# Patient Record
Sex: FEMALE | Race: BLACK OR AFRICAN AMERICAN | Employment: FULL TIME | URBAN - METROPOLITAN AREA
[De-identification: names, ages, dates, MRNs, and addresses within clinical notes are randomized per-mention and may not be internally consistent; named-entity substitution may affect disease eponyms.]

---

## 2018-10-29 LAB
HBSAG, EXTERNAL: NEGATIVE
HIV, EXTERNAL: NEGATIVE
RPR, EXTERNAL: NONREACTIVE
RUBELLA, EXTERNAL: NORMAL
TYPE, ABO & RH, EXTERNAL: NORMAL

## 2019-03-22 LAB — GRBS, EXTERNAL: NEGATIVE

## 2019-04-17 ENCOUNTER — ANESTHESIA (OUTPATIENT)
Dept: LABOR AND DELIVERY | Age: 29
DRG: 560 | End: 2019-04-17
Payer: MEDICAID

## 2019-04-17 ENCOUNTER — ANESTHESIA EVENT (OUTPATIENT)
Dept: LABOR AND DELIVERY | Age: 29
DRG: 560 | End: 2019-04-17
Payer: MEDICAID

## 2019-04-17 ENCOUNTER — HOSPITAL ENCOUNTER (INPATIENT)
Age: 29
LOS: 2 days | Discharge: HOME | DRG: 560 | End: 2019-04-19
Attending: OBSTETRICS & GYNECOLOGY | Admitting: OBSTETRICS & GYNECOLOGY
Payer: MEDICAID

## 2019-04-17 PROBLEM — Z3A.40 40 WEEKS GESTATION OF PREGNANCY: Status: ACTIVE | Noted: 2019-04-17

## 2019-04-17 PROBLEM — Z33.1 IUP (INTRAUTERINE PREGNANCY), INCIDENTAL: Status: ACTIVE | Noted: 2019-04-17

## 2019-04-17 LAB
ABO + RH BLD: NORMAL
APPEARANCE UR: CLEAR
BASOPHILS # BLD: 0 K/UL (ref 0–0.1)
BASOPHILS NFR BLD: 0 % (ref 0–2)
BILIRUB UR QL: NEGATIVE
BLOOD GROUP ANTIBODIES SERPL: NORMAL
COLOR UR: YELLOW
DIFFERENTIAL METHOD BLD: ABNORMAL
EOSINOPHIL # BLD: 0 K/UL (ref 0–0.4)
EOSINOPHIL NFR BLD: 0 % (ref 0–5)
ERYTHROCYTE [DISTWIDTH] IN BLOOD BY AUTOMATED COUNT: 15.4 % (ref 11.6–14.5)
GLUCOSE UR QL STRIP.AUTO: NEGATIVE MG/DL
HCT VFR BLD AUTO: 37.6 % (ref 35–45)
HGB BLD-MCNC: 12.1 G/DL (ref 12–16)
KETONES UR-MCNC: NEGATIVE MG/DL
LEUKOCYTE ESTERASE UR QL STRIP: ABNORMAL
LYMPHOCYTES # BLD: 2.2 K/UL (ref 0.9–3.6)
LYMPHOCYTES NFR BLD: 20 % (ref 21–52)
MCH RBC QN AUTO: 23.4 PG (ref 24–34)
MCHC RBC AUTO-ENTMCNC: 32.2 G/DL (ref 31–37)
MCV RBC AUTO: 72.9 FL (ref 74–97)
MONOCYTES # BLD: 1.1 K/UL (ref 0.05–1.2)
MONOCYTES NFR BLD: 11 % (ref 3–10)
NEUTS SEG # BLD: 7.4 K/UL (ref 1.8–8)
NEUTS SEG NFR BLD: 69 % (ref 40–73)
NITRITE UR QL: NEGATIVE
PH UR: 7 [PH] (ref 5–9)
PLATELET # BLD AUTO: 215 K/UL (ref 135–420)
PROT UR QL: NEGATIVE MG/DL
RBC # BLD AUTO: 5.16 M/UL (ref 4.2–5.3)
RBC # UR STRIP: ABNORMAL /UL
SERVICE CMNT-IMP: ABNORMAL
SP GR UR: 1.01 (ref 1–1.02)
SPECIMEN EXP DATE BLD: NORMAL
UROBILINOGEN UR QL: 0.2 EU/DL (ref 0.2–1)
WBC # BLD AUTO: 10.7 K/UL (ref 4.6–13.2)

## 2019-04-17 PROCEDURE — 74011000250 HC RX REV CODE- 250

## 2019-04-17 PROCEDURE — 74011250637 HC RX REV CODE- 250/637: Performed by: MIDWIFE

## 2019-04-17 PROCEDURE — 75410000002 HC LABOR FEE PER 1 HR

## 2019-04-17 PROCEDURE — 81003 URINALYSIS AUTO W/O SCOPE: CPT

## 2019-04-17 PROCEDURE — 77030011943

## 2019-04-17 PROCEDURE — 10907ZC DRAINAGE OF AMNIOTIC FLUID, THERAPEUTIC FROM PRODUCTS OF CONCEPTION, VIA NATURAL OR ARTIFICIAL OPENING: ICD-10-PCS | Performed by: OBSTETRICS & GYNECOLOGY

## 2019-04-17 PROCEDURE — 75410000003 HC RECOV DEL/VAG/CSECN EA 0.5 HR

## 2019-04-17 PROCEDURE — 74011250636 HC RX REV CODE- 250/636

## 2019-04-17 PROCEDURE — 75410000000 HC DELIVERY VAGINAL/SINGLE

## 2019-04-17 PROCEDURE — 85025 COMPLETE CBC W/AUTO DIFF WBC: CPT

## 2019-04-17 PROCEDURE — 74011250636 HC RX REV CODE- 250/636: Performed by: OBSTETRICS & GYNECOLOGY

## 2019-04-17 PROCEDURE — 65270000029 HC RM PRIVATE

## 2019-04-17 PROCEDURE — 59025 FETAL NON-STRESS TEST: CPT

## 2019-04-17 PROCEDURE — 86900 BLOOD TYPING SEROLOGIC ABO: CPT

## 2019-04-17 PROCEDURE — 77030010848 HC CATH INTUTR PRSS KOLB -B

## 2019-04-17 PROCEDURE — 74011250636 HC RX REV CODE- 250/636: Performed by: SPECIALIST

## 2019-04-17 RX ORDER — AMOXICILLIN 250 MG
1 CAPSULE ORAL
Status: DISCONTINUED | OUTPATIENT
Start: 2019-04-17 | End: 2019-04-19 | Stop reason: HOSPADM

## 2019-04-17 RX ORDER — FENTANYL/ROPIVACAINE/NS/PF 2MCG/ML-.1
1-15 PLASTIC BAG, INJECTION (ML) EPIDURAL
Status: DISCONTINUED | OUTPATIENT
Start: 2019-04-17 | End: 2019-04-18 | Stop reason: HOSPADM

## 2019-04-17 RX ORDER — SODIUM CHLORIDE, SODIUM LACTATE, POTASSIUM CHLORIDE, CALCIUM CHLORIDE 600; 310; 30; 20 MG/100ML; MG/100ML; MG/100ML; MG/100ML
125 INJECTION, SOLUTION INTRAVENOUS CONTINUOUS
Status: DISCONTINUED | OUTPATIENT
Start: 2019-04-17 | End: 2019-04-18 | Stop reason: HOSPADM

## 2019-04-17 RX ORDER — NALBUPHINE HYDROCHLORIDE 10 MG/ML
2.5 INJECTION, SOLUTION INTRAMUSCULAR; INTRAVENOUS; SUBCUTANEOUS
Status: DISCONTINUED | OUTPATIENT
Start: 2019-04-17 | End: 2019-04-18 | Stop reason: HOSPADM

## 2019-04-17 RX ORDER — FENTANYL CITRATE 50 UG/ML
100 INJECTION, SOLUTION INTRAMUSCULAR; INTRAVENOUS ONCE
Status: COMPLETED | OUTPATIENT
Start: 2019-04-17 | End: 2019-04-17

## 2019-04-17 RX ORDER — FENTANYL CITRATE 50 UG/ML
INJECTION, SOLUTION INTRAMUSCULAR; INTRAVENOUS AS NEEDED
Status: DISCONTINUED | OUTPATIENT
Start: 2019-04-17 | End: 2019-04-17 | Stop reason: HOSPADM

## 2019-04-17 RX ORDER — ONDANSETRON 2 MG/ML
4 INJECTION INTRAMUSCULAR; INTRAVENOUS
Status: DISCONTINUED | OUTPATIENT
Start: 2019-04-17 | End: 2019-04-18 | Stop reason: HOSPADM

## 2019-04-17 RX ORDER — HYDROMORPHONE HYDROCHLORIDE 1 MG/ML
1 INJECTION, SOLUTION INTRAMUSCULAR; INTRAVENOUS; SUBCUTANEOUS
Status: DISCONTINUED | OUTPATIENT
Start: 2019-04-17 | End: 2019-04-18 | Stop reason: HOSPADM

## 2019-04-17 RX ORDER — IBUPROFEN 400 MG/1
800 TABLET ORAL
Status: DISCONTINUED | OUTPATIENT
Start: 2019-04-17 | End: 2019-04-19 | Stop reason: HOSPADM

## 2019-04-17 RX ORDER — LIDOCAINE HYDROCHLORIDE 10 MG/ML
INJECTION INFILTRATION; PERINEURAL AS NEEDED
Status: DISCONTINUED | OUTPATIENT
Start: 2019-04-17 | End: 2019-04-17 | Stop reason: HOSPADM

## 2019-04-17 RX ORDER — TERBUTALINE SULFATE 1 MG/ML
0.25 INJECTION SUBCUTANEOUS
Status: DISCONTINUED | OUTPATIENT
Start: 2019-04-17 | End: 2019-04-18 | Stop reason: HOSPADM

## 2019-04-17 RX ORDER — ACETAMINOPHEN 325 MG/1
650 TABLET ORAL
Status: DISCONTINUED | OUTPATIENT
Start: 2019-04-17 | End: 2019-04-19 | Stop reason: HOSPADM

## 2019-04-17 RX ORDER — LIDOCAINE HYDROCHLORIDE 10 MG/ML
INJECTION, SOLUTION EPIDURAL; INFILTRATION; INTRACAUDAL; PERINEURAL AS NEEDED
Status: DISCONTINUED | OUTPATIENT
Start: 2019-04-17 | End: 2019-04-17 | Stop reason: HOSPADM

## 2019-04-17 RX ORDER — ZOLPIDEM TARTRATE 5 MG/1
5 TABLET ORAL
Status: DISCONTINUED | OUTPATIENT
Start: 2019-04-17 | End: 2019-04-19 | Stop reason: HOSPADM

## 2019-04-17 RX ORDER — MINERAL OIL
30 OIL (ML) ORAL AS NEEDED
Status: DISCONTINUED | OUTPATIENT
Start: 2019-04-17 | End: 2019-04-18 | Stop reason: HOSPADM

## 2019-04-17 RX ORDER — BUTORPHANOL TARTRATE 2 MG/ML
2 INJECTION INTRAMUSCULAR; INTRAVENOUS
Status: DISCONTINUED | OUTPATIENT
Start: 2019-04-17 | End: 2019-04-18 | Stop reason: HOSPADM

## 2019-04-17 RX ORDER — NALBUPHINE HYDROCHLORIDE 10 MG/ML
10 INJECTION, SOLUTION INTRAMUSCULAR; INTRAVENOUS; SUBCUTANEOUS
Status: DISCONTINUED | OUTPATIENT
Start: 2019-04-17 | End: 2019-04-18 | Stop reason: HOSPADM

## 2019-04-17 RX ORDER — NALOXONE HYDROCHLORIDE 0.4 MG/ML
0.2 INJECTION, SOLUTION INTRAMUSCULAR; INTRAVENOUS; SUBCUTANEOUS AS NEEDED
Status: DISCONTINUED | OUTPATIENT
Start: 2019-04-17 | End: 2019-04-18 | Stop reason: HOSPADM

## 2019-04-17 RX ORDER — METHYLERGONOVINE MALEATE 0.2 MG/ML
0.2 INJECTION INTRAVENOUS AS NEEDED
Status: DISCONTINUED | OUTPATIENT
Start: 2019-04-17 | End: 2019-04-18 | Stop reason: HOSPADM

## 2019-04-17 RX ORDER — OXYTOCIN/RINGER'S LACTATE 20/1000 ML
125 PLASTIC BAG, INJECTION (ML) INTRAVENOUS CONTINUOUS
Status: DISCONTINUED | OUTPATIENT
Start: 2019-04-17 | End: 2019-04-18 | Stop reason: HOSPADM

## 2019-04-17 RX ORDER — FENTANYL/ROPIVACAINE/NS/PF 2MCG/ML-.1
PLASTIC BAG, INJECTION (ML) EPIDURAL
Status: COMPLETED
Start: 2019-04-17 | End: 2019-04-17

## 2019-04-17 RX ORDER — DIPHENHYDRAMINE HYDROCHLORIDE 50 MG/ML
25 INJECTION, SOLUTION INTRAMUSCULAR; INTRAVENOUS
Status: DISCONTINUED | OUTPATIENT
Start: 2019-04-17 | End: 2019-04-18 | Stop reason: HOSPADM

## 2019-04-17 RX ORDER — PROMETHAZINE HYDROCHLORIDE 25 MG/ML
25 INJECTION, SOLUTION INTRAMUSCULAR; INTRAVENOUS
Status: DISCONTINUED | OUTPATIENT
Start: 2019-04-17 | End: 2019-04-19 | Stop reason: HOSPADM

## 2019-04-17 RX ORDER — LIDOCAINE HYDROCHLORIDE 10 MG/ML
20 INJECTION, SOLUTION EPIDURAL; INFILTRATION; INTRACAUDAL; PERINEURAL AS NEEDED
Status: DISCONTINUED | OUTPATIENT
Start: 2019-04-17 | End: 2019-04-18 | Stop reason: HOSPADM

## 2019-04-17 RX ORDER — FENTANYL CITRATE 50 UG/ML
INJECTION, SOLUTION INTRAMUSCULAR; INTRAVENOUS
Status: COMPLETED
Start: 2019-04-17 | End: 2019-04-17

## 2019-04-17 RX ORDER — OXYTOCIN/RINGER'S LACTATE 20/1000 ML
999 PLASTIC BAG, INJECTION (ML) INTRAVENOUS ONCE
Status: DISCONTINUED | OUTPATIENT
Start: 2019-04-17 | End: 2019-04-18 | Stop reason: HOSPADM

## 2019-04-17 RX ORDER — PHENYLEPHRINE HCL IN 0.9% NACL 1 MG/10 ML
80 SYRINGE (ML) INTRAVENOUS AS NEEDED
Status: DISCONTINUED | OUTPATIENT
Start: 2019-04-17 | End: 2019-04-18 | Stop reason: HOSPADM

## 2019-04-17 RX ADMIN — ROPIVACAINE HYDROCHLORIDE 15 ML/HR: 10 INJECTION, SOLUTION EPIDURAL at 15:38

## 2019-04-17 RX ADMIN — SODIUM CHLORIDE, SODIUM LACTATE, POTASSIUM CHLORIDE, AND CALCIUM CHLORIDE 1000 ML: 600; 310; 30; 20 INJECTION, SOLUTION INTRAVENOUS at 14:30

## 2019-04-17 RX ADMIN — Medication 15 ML/HR: at 19:12

## 2019-04-17 RX ADMIN — SODIUM CHLORIDE, SODIUM LACTATE, POTASSIUM CHLORIDE, AND CALCIUM CHLORIDE 125 ML/HR: 600; 310; 30; 20 INJECTION, SOLUTION INTRAVENOUS at 15:50

## 2019-04-17 RX ADMIN — ROPIVACAINE HYDROCHLORIDE 15 ML/HR: 10 INJECTION, SOLUTION EPIDURAL at 21:02

## 2019-04-17 RX ADMIN — FENTANYL CITRATE 100 MCG: 50 INJECTION, SOLUTION INTRAMUSCULAR; INTRAVENOUS at 15:00

## 2019-04-17 RX ADMIN — Medication 15 ML/HR: at 15:38

## 2019-04-17 RX ADMIN — LIDOCAINE HYDROCHLORIDE 5 ML: 10 INJECTION, SOLUTION EPIDURAL; INFILTRATION; INTRACAUDAL; PERINEURAL at 15:36

## 2019-04-17 RX ADMIN — LIDOCAINE HYDROCHLORIDE 8 ML: 10 INJECTION INFILTRATION; PERINEURAL at 15:20

## 2019-04-17 RX ADMIN — DIPHENHYDRAMINE HYDROCHLORIDE 25 MG: 50 INJECTION, SOLUTION INTRAMUSCULAR; INTRAVENOUS at 16:17

## 2019-04-17 RX ADMIN — Medication 15 ML/HR: at 21:02

## 2019-04-17 RX ADMIN — FENTANYL CITRATE 100 MCG: 50 INJECTION, SOLUTION INTRAMUSCULAR; INTRAVENOUS at 15:36

## 2019-04-17 RX ADMIN — IBUPROFEN 800 MG: 400 TABLET, FILM COATED ORAL at 22:43

## 2019-04-17 NOTE — H&P
History & Physical 
 
Name: Rupal Chacon MRN: [id number]  SSN: [id number] YOB: 1990  Age: 29 y.o. Sex: female Subjective:  
 
Estimated Date of Delivery: 19 OB History Larry Siegel 3 Para 2 Term 1  1 AB  
0 Living  
2 SAB  
0  
 TAB  
0 Ectopic  
0 Molar  
0 Multiple  
0 Live Births 2 Ms. Eileen Nolasco is admitted with pregnancy at 40w1d for active labor. Prenatal course was normal. Please see prenatal records for details. No Known Allergies Objective:  
 
Vitals: 
Vitals:  
 19 1645 19 1700 19 1715 19 1730 BP: 119/81 127/75 130/68 120/70 Pulse: (!) 112 81 85 83 Resp:      
Temp:      
Weight:      
Height:      
  
 
Physical Exam: 
Heart: Regular rate and rhythm Lung: clear to auscultation throughout lung fields, no wheezes, no rales, no rhonchi and normal respiratory effort Abdomen: soft, nontender Perineum: blood absent, amniotic fluid absent Cervical Exam: 5 cm dilated 100% effaced   
-1 station Membranes:  Intact Fetal Heart Rate & Contraction pattern: Reactive Prenatal Labs:  
No results found for: RUBELLAEXT, GRBSEXT, HBSAGEXT, HIVEXT, RPREXT, GONNOEXT, CHLAMEXT Assessment/Plan:  
 
Plan: Admit for Reassuring fetal status, Labor  Progressing normally Continue expectant management, Continue plan for vaginal delivery. Group B Strep was negative. Signed By:  Una Parks CNM 2019

## 2019-04-17 NOTE — PROGRESS NOTES
5  presents to triage feeling contractions, \"every 5 minutes. \" EFM applied. Hx confirmed. Pt oriented to room. Wyatt Muhammad CNM on the phone and telephone orders received to admit pt for labor with labor order set. No further orders received at this time.

## 2019-04-17 NOTE — PROGRESS NOTES
Problem: Pain Goal: *Control of Pain Outcome: Progressing Towards Goal 
  
Problem: Patient Education: Go to Patient Education Activity Goal: Patient/Family Education Outcome: Progressing Towards Goal 
  
Problem: Vaginal Delivery: Day of Deliver-Laboring Goal: Activity/Safety Outcome: Progressing Towards Goal

## 2019-04-17 NOTE — ANESTHESIA PROCEDURE NOTES
Epidural Block    Start time: 2019 3:20 PM  End time: 2019 3:39 PM  Performed by: Selvin Kitchen MD  Authorized by: Selvin Kitchen MD     Pre-Procedure  Indication: labor epidural    Preanesthetic Checklist: risks and benefits discussed and timeout performed      Epidural:   Patient position:  Seated  Prep region:  Lumbar  Prep: Chlorhexidine    Location:  L3-4    Needle and Epidural Catheter:   Needle Type:  Tuohy  Needle Gauge:  17 G  Injection Technique:  Loss of resistance using saline  Attempts:  1  Catheter Size:  20 G  Events: no blood with aspiration, no cerebrospinal fluid with aspiration, no paresthesia and negative aspiration test    Test Dose:  Negative    Assessment:   Catheter Secured:  Tegaderm and tape  Insertion:  Uncomplicated  Patient tolerance:  Patient tolerated the procedure well with no immediate complications    Tere Jackman   = [id number]  CSN = [id number]    Epidural Catheter Placement    Risks, benefits, and alternatives discussed including:  A severe headache that can last for days or weeks  Permanent paralysis  Injury to baby  Chronic back pain    Time out performed, correct patient and site identified. Standard monitors applied. 1% lidocaine infiltrated,     Loss of Resistance distinct @ 8 cm  NS 10 ml injected prior to catheter placement. Catheter would not pass. Advance needle about 1 mm. Catheter threaded. Catheter 12 cm at skin. Negative test dose through filter. Secured  BP check  Pump Started  Patient tolerated the procedure well, VSS throughout, no apparent complications.       Tere Jackman   = [id number]  CSN = [id number]

## 2019-04-17 NOTE — PROGRESS NOTES
Bedside report received from Danita Mcleod RN. Epidural pump verified.  VSS. Assessment complete. Plan of care reviewed.  Turned to right lateral position.  LUCHO Villa CNM at bedside.  High prajapati position.  Complete.   viable Female infant. 0005 Ambulated up to BR and voided a large amount. Assisted with pericare. Pads changed. Tolerated well w/o any dizziness or weakness. 0015 TRANSFER - OUT REPORT: 
 
Verbal report given to M. Kandis Lundborg RN(name) on [facility]  being transferred to ANNA MARIE Yost RNC(unit) for routine progression of care Report consisted of patients Situation, Background, Assessment and  
Recommendations(SBAR). Information from the following report(s) SBAR, Procedure Summary and MAR was reviewed with the receiving nurse. Lines:  
Peripheral IV 19 Left;Posterior Hand (Active) Site Assessment Clean, dry, & intact 2019 12:30 AM  
Phlebitis Assessment 0 2019 12:30 AM  
Infiltration Assessment 0 2019 12:30 AM  
Dressing Status Clean, dry, & intact 2019 12:30 AM  
Dressing Type Tape;Transparent 2019 12:30 AM  
Hub Color/Line Status Green 2019  2:28 PM  
Alcohol Cap Used Yes 2019  2:28 PM  
  
 
Opportunity for questions and clarification was provided. Patient transported with: 
 Registered Nurse

## 2019-04-17 NOTE — PROGRESS NOTES
Ar Sanders on the phone and updated regarding pt's status. No further orders received at this time. 1339 Pt to labor and delivery room 4 for labor. 0 Pt requesting epidural at this time. Dr. Tamir Patel paged. 1514 Pt up to the bathroom prior to epidural placement. [id number] Dr. Tamir Patel at bedside explaining epidural procedure, side effects, risks, benefits, and positioning. Patient verbalizes understanding. Time-out: 1520 Procedure start: 1772 Catheter in, needle out: [place] Test dose: 1536 Fentanyl vial handed to MD at bedside. Loading dose: 1536 Patient connected to pump:1538 
1719 Sterile st cath complete. Pt tolerated well. Pt denies further needs at this time. 1808 LUCHO Mroris CNM at bedside for SVE. Pt denies further needs at this time. 500 [address] PAULO Morris at bedside for SVE and AROM. Pt to left lateral recumbent position with right leg in stirrups. Pt tolerated well and denies further needs at this time. 1906 Nurse at bedside. [facility] Pt tolerated well. Pt denies further needs at this time. 1915 Bedside shift change report given to ANNA MARIE Zuniga RN by Edgardo Rodríguez. Report included the following information SBAR, Intake/Output and MAR.

## 2019-04-17 NOTE — PROGRESS NOTES
Problem: Pain Goal: *Control of Pain Outcome: Progressing Towards Goal 
Goal: *PALLIATIVE CARE:  Alleviation of Pain Outcome: Progressing Towards Goal 
  
Problem: Patient Education: Go to Patient Education Activity Goal: Patient/Family Education Outcome: Progressing Towards Goal 
  
Problem: Patient Education: Go to Patient Education Activity Goal: Patient/Family Education Outcome: Progressing Towards Goal 
  
Problem: Vaginal Delivery: Day of Deliver-Laboring Goal: Off Pathway (Use only if patient is Off Pathway) Outcome: Progressing Towards Goal 
Goal: Activity/Safety Outcome: Progressing Towards Goal 
Goal: Consults, if ordered Outcome: Progressing Towards Goal 
Goal: Diagnostic Test/Procedures Outcome: Progressing Towards Goal 
Goal: Nutrition/Diet Outcome: Progressing Towards Goal 
Goal: Discharge Planning Outcome: Progressing Towards Goal 
Goal: Medications Outcome: Progressing Towards Goal 
Goal: Respiratory Outcome: Progressing Towards Goal 
Goal: Treatments/Interventions/Procedures Outcome: Progressing Towards Goal 
Goal: *Vital signs within defined limits Outcome: Progressing Towards Goal 
Goal: *Labs within defined limits Outcome: Progressing Towards Goal 
Goal: *Hemodynamically stable Outcome: Progressing Towards Goal 
Goal: *Optimal pain control at patient's stated goal 
Outcome: Progressing Towards Goal

## 2019-04-17 NOTE — ANESTHESIA PREPROCEDURE EVALUATION
Relevant Problems No relevant active problems Anesthetic History No history of anesthetic complications Pertinent negatives: No PONV Review of Systems / Medical History Patient summary reviewed, nursing notes reviewed and pertinent labs reviewed Pulmonary Pertinent negatives: No asthma and smoker Neuro/Psych Within defined limits Cardiovascular Within defined limits Pertinent negatives: No hypertension, past MI and CADPacemaker: first time gest htn. GI/Hepatic/Renal 
Within defined limits Pertinent negatives: No liver disease and renal disease Endo/Other Morbid obesity Pertinent negatives: No diabetes Other Findings Physical Exam 
 
Airway Mallampati: II 
TM Distance: > 6 cm Neck ROM: normal range of motion Mouth opening: Normal 
 
 Cardiovascular Dental 
No notable dental hx Pulmonary Abdominal 
 
 
 
 Other Findings Anesthetic Plan ASA: 3 Anesthesia type: epidural 
 
 
 
 
 
Anesthetic plan and risks discussed with: Patient and Family Epidural Risks Risks, benefits, and alternatives discussed including: 
Some work better than others. Some can work for a while then quit. A severe headache that can last for days or weeks Permanent paralysis Injury to baby Chronic back pain 
 
no questions.

## 2019-04-18 LAB
HCT VFR BLD AUTO: 34.6 % (ref 35–45)
HGB BLD-MCNC: 11.1 G/DL (ref 12–16)

## 2019-04-18 PROCEDURE — 74011250637 HC RX REV CODE- 250/637: Performed by: MIDWIFE

## 2019-04-18 PROCEDURE — 85014 HEMATOCRIT: CPT

## 2019-04-18 PROCEDURE — 65270000029 HC RM PRIVATE

## 2019-04-18 PROCEDURE — 36415 COLL VENOUS BLD VENIPUNCTURE: CPT

## 2019-04-18 PROCEDURE — 85018 HEMOGLOBIN: CPT

## 2019-04-18 RX ORDER — IBUPROFEN 800 MG/1
800 TABLET ORAL
Qty: 40 TAB | Refills: 0 | Status: SHIPPED | OUTPATIENT
Start: 2019-04-18

## 2019-04-18 RX ORDER — DIPHENHYDRAMINE HCL 25 MG
25 CAPSULE ORAL ONCE
Status: DISCONTINUED | OUTPATIENT
Start: 2019-04-18 | End: 2019-04-18

## 2019-04-18 RX ORDER — DIPHENHYDRAMINE HCL 25 MG
25 CAPSULE ORAL ONCE
Status: COMPLETED | OUTPATIENT
Start: 2019-04-18 | End: 2019-04-18

## 2019-04-18 RX ORDER — DIPHENHYDRAMINE HCL 25 MG
CAPSULE ORAL
Status: DISPENSED
Start: 2019-04-18 | End: 2019-04-18

## 2019-04-18 RX ADMIN — DIPHENHYDRAMINE HYDROCHLORIDE 25 MG: 25 CAPSULE ORAL at 01:22

## 2019-04-18 NOTE — DISCHARGE INSTRUCTIONS
POST DELIVERY DISCHARGE INSTRUCTIONS    Name: Ronald Villafana  YOB: 1990  Primary Diagnosis: Active Problems:    IUP (intrauterine pregnancy), incidental (4/17/2019)      40 weeks gestation of pregnancy (4/17/2019)      Postpartum care and examination (4/18/2019)        General:     Diet/Diet Restrictions:  Eight 8-ounce glasses of fluid daily (water, juices); avoid excessive caffeine intake. Meals/snacks as desired which are high in fiber and carbohydrates and low in fat and cholesterol. Physical Activity / Restrictions / Safety:     Avoid heavy lifting, no more than the baby alone (not the baby in the car seat). Avoid intercourse until you are seen at your postpartum visit. No douching or tampon use. Check with obstetrician before starting or resuming an exercise program.         Discharge Instructions/Special Treatment/Home Care Needs:     Continue prenatal vitamins. Continue to use squirt bottle with warm water on your perineum after each bathroom use until bleeding stops. Call your doctor for the following:     Fever over 101 degrees by mouth. Vaginal bleeding that soaks two pads per hour for more than one hour. Red streaks or increased swelling of legs, painful red streaks on your breast.  If you feel extremely anxious or overwhelmed. If you have thoughts of harming yourself and/or your baby. Pain Management:     Pain Management:   Take Acetaminophen (Tylenol) or Ibuprofen (Advil, Motrin), as directed for pain. Use a warm Sitz bath 3 times daily to relieve perineal or hemorrhoidal discomfort. For hemorrhoidal discomfort, use Tucks and Anusol cream as needed and directed.     Follow-Up Care:     Appointment with MD:   Follow-up Appointments   Procedures    FOLLOW UP VISIT Appointment in: 6 Weeks     Standing Status:   Standing     Number of Occurrences:   1     Order Specific Question:   Appointment in     Answer:   Farhan Haines     Telephone number: [phone]      Signed By: Paula Weston Rochelle Fuller

## 2019-04-18 NOTE — PROGRESS NOTES
Assumed care of pt. 
0925-Assessment completed. to do skin to skin with infant. Denies needs. 1100-Bedside and Verbal shift change report given to ROYAL Cruz RN  (oncoming nurse) by ANNA MARIE Mariee LPN (offgoing nurse). Report given with SBAR, Kardex, Intake/Output, MAR and Recent Results.

## 2019-04-18 NOTE — L&D DELIVERY NOTE
Delivery Note    Obstetrician:  Brittani Chanel CNM    Assistant: none    Pre-Delivery Diagnosis: Term pregnancy, Spontaneous labor, Single fetus and Uncomplicated pregnancy    Post-Delivery Diagnosis: Living  infant(s) and Female    Intrapartum Event: Multiple variable decelerations    Procedure: Spontaneous vaginal delivery    Epidural: YES    Monitor:  Fetal Heart Tones - External and Uterine Contractions - External    Indications for instrumental delivery: none    Estimated Blood Loss: 200    Episiotomy: none    Laceration(s):  none    Laceration(s) repair: NO    Presentation: Cephalic    Fetal Description: ramirez    Fetal Position: Occiput Anterior    Birth Weight: not yet assessed    Birth Length: not yet assessed    Apgar - One Minute: 8    Apgar - Five Minutes: 9    Umbilical Cord: True knot, 3 vessels present and VA blood spot  Specimens: none  Complications:  none           Cord Blood Results:   Information for the patient's :  Logan Erin [[id number]]   No results found for: PCTABR, PCTDIG, BILI, ABORH    Prenatal Labs:     Lab Results   Component Value Date/Time    ABO/Rh(D) A POSITIVE 2019 01:52 PM    HBsAg, External Negative 10/29/2018    HIV, External Negative 10/29/2018    Rubella, External Immune 10/29/2018    RPR, External Nonreactive 10/29/2018    GrBStrep, External Negative 2019        Attending Attestation: I was present and scrubbed for the entire procedure

## 2019-04-18 NOTE — PROGRESS NOTES
Progress Note Patient: Riley Stockton MRN: [id number] YOB: 1990  Age: 29 y.o. Subjective:  
 
Postpartum Day: 1 The patient is feeling well. Pain is  well controlled with current medications. Baby is feeding via breast without difficulty. Urinary output is adequate. Objective:  
  
Patient Vitals for the past 8 hrs: 
 BP Temp Pulse Resp SpO2  
04/18/19 0728 134/74 98.1 °F (36.7 °C) 88 16 99 % General:    alert, cooperative Lochia:  appropriate Uterine Fundus:   firm @ umbilicus Perineum:  Intact DVT Evaluation:  No evidence of DVT seen on physical exam.  
 
Lab/Data Review: 
Recent Results (from the past 24 hour(s)) POC URINE MACROSCOPIC Collection Time: 04/17/19  1:09 PM  
Result Value Ref Range Color YELLOW Appearance CLEAR Spec. gravity (POC) 1.015 1.001 - 1.023    
 pH, urine  (POC) 7.0 5.0 - 9.0 Protein (POC) NEGATIVE  NEG mg/dL Glucose, urine (POC) NEGATIVE  NEG mg/dL Ketones (POC) NEGATIVE  NEG mg/dL Bilirubin (POC) NEGATIVE  NEG Blood (POC) Trace Intact (A) NEG Urobilinogen (POC) 0.2 0.2 - 1.0 EU/dL Nitrite (POC) NEGATIVE  NEG Leukocyte esterase (POC) LARGE (A) NEG Performed by Hawa Garcia CBC WITH AUTOMATED DIFF Collection Time: 04/17/19  1:52 PM  
Result Value Ref Range WBC 10.7 4.6 - 13.2 K/uL  
 RBC 5.16 4.20 - 5.30 M/uL  
 HGB 12.1 12.0 - 16.0 g/dL HCT 37.6 35.0 - 45.0 % MCV 72.9 (L) 74.0 - 97.0 FL  
 MCH 23.4 (L) 24.0 - 34.0 PG  
 MCHC 32.2 31.0 - 37.0 g/dL  
 RDW 15.4 (H) 11.6 - 14.5 % PLATELET 735 474 - 742 K/uL NEUTROPHILS 69 40 - 73 % LYMPHOCYTES 20 (L) 21 - 52 % MONOCYTES 11 (H) 3 - 10 % EOSINOPHILS 0 0 - 5 % BASOPHILS 0 0 - 2 %  
 ABS. NEUTROPHILS 7.4 1.8 - 8.0 K/UL  
 ABS. LYMPHOCYTES 2.2 0.9 - 3.6 K/UL  
 ABS. MONOCYTES 1.1 0.05 - 1.2 K/UL  
 ABS. EOSINOPHILS 0.0 0.0 - 0.4 K/UL  
 ABS.  BASOPHILS 0.0 0.0 - 0.1 K/UL  
 DF AUTOMATED    
TYPE & SCREEN  
 Collection Time: 19  1:52 PM  
Result Value Ref Range Crossmatch Expiration 2019 ABO/Rh(D) A POSITIVE Antibody screen NEG All lab results for the last 24 hours reviewed. Assessment:  
 
Delivery:  Plan:  
 
Doing well postpartum vaginal delivery. Continue current postpartum care. Encouraged hydration, nutrition and ambulation. DC home tomorrow. Follow-up in office in 6 weeks. Call prn. There are no discharge medications for this patient. Signed By: Kiara Alarcon CNM 2019

## 2019-04-18 NOTE — PROGRESS NOTES
Bedside and Verbal shift change report given to LUCHO Abarca RN (oncoming nurse) by Kamini Pitts RN (offgoing nurse). Report included the following information SBAR, Kardex, Intake/Output, MAR and Recent Results. 2015- Assessment completed. POC discussed on mom and infant. No needs or concerns at this time. Infant switched to similac formula as mom is planning on using WIC. 0700- Bedside and Verbal shift change report given to MANAN Pitts RN (oncoming nurse) by Ross Abarca RN (offgoing nurse). Report included the following information SBAR, Kardex, Intake/Output, MAR and Recent Results.

## 2019-04-18 NOTE — PROGRESS NOTES
Labor Progress Note Patient seen, fetal heart rate and contraction pattern evaluated, patient examined. Patient Vitals for the past 1 hrs: 
 BP Temp Pulse Resp  
04/17/19 1915 126/81 98.2 °F (36.8 °C) 87 18 Physical Exam: 
Cervical Exam:  9 cm dilated 100% effaced   
-1 station Cervical Position: anterior Membranes:  Artificial Rupture of Membranes; Amniotic Fluid: medium amount of clear fluid Uterine Activity: Frequency: Every 2-3 minutes, Duration: 60-90 seconds and Intensity: strong Fetal Heart Rate: Baseline: 150 per minute Variability: moderate Accelerations: yes Decelerations: variable and late Assessment/Plan: Interruption in fetal oxygenation as manifested by Variables and intermittent late decelerations. Overall reassured by moderate variability and accelerations demonstrating fetal oxygenation. Progressing as expected, continue expectant management and continue plan for vaginal delivery. Elle Higgins CNM

## 2019-04-18 NOTE — ANESTHESIA POSTPROCEDURE EVALUATION
4/18/2019 
10:54 AM 
 
Laboring Epidural Follow-up Note Referring physician: Afua Herrera MD  
Patient status post vaginal delivery with labor epidural 
 
Visit Vitals /74 (BP 1 Location: Left arm, BP Patient Position: At rest) Pulse 88 Temp 36.7 °C (98.1 °F) Resp 16 Ht 5' 7\" (1.702 m) Wt 127 kg (280 lb) SpO2 99% Breastfeeding? Unknown BMI 43.85 kg/m² Epidural removed by L&D staff No sedation, pruritis noted. Adequate analgesia. No obvious anesthesia complications Rick Mcintosh CRNA

## 2019-04-18 NOTE — PROGRESS NOTES
0010 TRANSFER - IN REPORT: 
 
Verbal report received from [facility] RN (name) on Wilber Infante  being received from Labor and Delivery unit) for routine progression of care Report consisted of patients Situation, Background, Assessment and  
Recommendations(SBAR). Information from the following report(s) SBAR, Intake/Output, MAR and Recent Results was reviewed with the receiving nurse. Opportunity for questions and clarification was provided. Assessment completed upon patients arrival to unit and care assumed. 0700 Bedside, Verbal, shift change report given to ANNA MARIE Mariee LPN (oncoming nurse) by Rolando Mckenna RN (offgoing nurse). Report included the following information SBAR, Intake/Output, MAR and Recent Results.

## 2019-04-18 NOTE — ADT AUTH CERT NOTES
Patient Name Bonnie Shelton 
[id number] Sex Female  
1990 Address [place] Phone [phone] (Mobile) CSN:  
[id number] Admit Date: Admit Time Room Bed 2019 12:55  [[id number]] 01 [[id number]] Attending Providers Provider Pager From To Juan Jaquez MD  19 Mary Ann You MD  19 Birth History Birth Information Birth Length: 51 cm Birth Weight: 2.59 kg Birth Head Circ: 34 cm Gestational Age: 36 1/7 weeks Delivery Method: Vaginal, Spontaneous Duration of Labor: 1st: 9h 30m / 2nd: 10m APGARs 1 Minute: 8  
5 Minute: 9

## 2019-04-19 VITALS
HEIGHT: 67 IN | DIASTOLIC BLOOD PRESSURE: 78 MMHG | WEIGHT: 280 LBS | SYSTOLIC BLOOD PRESSURE: 129 MMHG | OXYGEN SATURATION: 100 % | BODY MASS INDEX: 43.95 KG/M2 | HEART RATE: 89 BPM | TEMPERATURE: 98.6 F | RESPIRATION RATE: 18 BRPM

## 2019-04-19 NOTE — PROGRESS NOTES
Chart reviewed for discharge teaching. Time spent with patient reviewing discharge instructions to include the following information that was also provided in written form to the patient; normal vaginal bleeding to expect how to care for perineum and how to respond should she experience an increase in vaginal bleeding. Patient instructed that she should not lift more than the weight of her baby for at least a week (2 weeks if she delivered by  section). She was encouraged to stay hydrated and informed that she should not have sex, douche, use tampons,  or place anything in the vagina until her postpartum visit. Additionally she was instructed not to use tub baths, hot tubs or pools until cleared by her midwife or physician. Patient was informed that some swelling of her legs can be expected after delivery and to elevate them whenever possible. If the swelling increases or she has signs of calf pain/tenderness, or redness that is present in one leg more than the other she is to notify her provider or present in the emergency department for evaluation if unable to reach provider. Patients having delivered by  section were instructed not to drive for the first 2 weeks, to only go up and down stairs 1 time in a day for 2 weeks as this will increase her risk for the incision to open. She was also instructed not to scrub the incision but to allow soap and water to fall onto it and to pat dry. Patient was given signs and symptoms of infection and instructed to call provider with temperature equal to or > than 100.4, foul smelling blood or discharge from the vagina, and increase in redness or discharge from incisions or an open wound that is not healing.  
Patient was also instructed on the signs of postpartum depression and instructed that if she is considering harming herself or her infant, feels out of control, unable to care for herself or baby, feels sad or depressed most of the day every day, is having trouble sleeping or sleeping too much or is having trouble bonding with her baby she is to call her provider or present in the emergency department. Patient was also provided with signs and symptoms of a pulmonary embolism (PE). She was told what a PE is and instructed to call 911 if she experiences SOB (fast, shallow, rapid respirations) at rest, chest pain that worsens when coughing or change in her level of consciousness. Patient was informed that she might experience blood pressure changes during the postpartum weeks and that she should contact her provider with any severe, constant headaches that do not respond to over-the-counter pain medication, rest and/or hydration. She is also to call her provider with any changes in vision, seeing spots, or flashing lights, pain in the upper right quadrant of the abdomen, swelling of the face, hands, and/or legs more than what is expected or a change in her level of consciousness. Patient was strongly encouraged to keep her postpartum appointment and emphasized the importance of telling all providers her delivery date up until one year after the birth of her baby. Date of postpartum visit was confirmed prior to delivery. All questions were answered and patient voiced understanding of the information provided.

## 2019-04-19 NOTE — PROGRESS NOTES
Patient discharged via wheelchair per protocol. Patient in stable condition. Discharged education reviewed and packet given to patient. Patient verbalizes understanding of discharge instructions. E-sign completed. Armbands removed and given to patient. No further needs or questions reported at this time.